# Patient Record
Sex: MALE | URBAN - METROPOLITAN AREA
[De-identification: names, ages, dates, MRNs, and addresses within clinical notes are randomized per-mention and may not be internally consistent; named-entity substitution may affect disease eponyms.]

---

## 2023-05-18 ENCOUNTER — TRANSFERRED RECORDS (OUTPATIENT)
Dept: HEALTH INFORMATION MANAGEMENT | Facility: CLINIC | Age: 38
End: 2023-05-18

## 2023-06-14 ENCOUNTER — MEDICAL CORRESPONDENCE (OUTPATIENT)
Dept: HEALTH INFORMATION MANAGEMENT | Facility: CLINIC | Age: 38
End: 2023-06-14

## 2023-06-14 ENCOUNTER — TRANSCRIBE ORDERS (OUTPATIENT)
Dept: OTHER | Age: 38
End: 2023-06-14

## 2023-06-14 DIAGNOSIS — R63.4 WEIGHT LOSS: Primary | ICD-10-CM

## 2023-06-14 DIAGNOSIS — K63.8219 SMALL INTESTINAL BACTERIAL OVERGROWTH (SIBO): ICD-10-CM

## 2023-06-16 ENCOUNTER — DOCUMENTATION ONLY (OUTPATIENT)
Dept: GASTROENTEROLOGY | Facility: CLINIC | Age: 38
End: 2023-06-16

## 2023-06-16 NOTE — PROGRESS NOTES
RADHA - Kathie Kahn RN - request records from [facility].  For some reason the notes do not come into care every where.    Can I get some notes from:   Lydia Hendricks PA-C   [address] [address]   [facility] [address]     To show 2nd opinion    Faxed request to [facility]  Fax: [phone]  On Friday 6/16/2023 at 11:05AM    Notified Víctor that request was faxed.    Elizabeth Ford MA

## 2023-06-19 ENCOUNTER — TELEPHONE (OUTPATIENT)
Dept: GASTROENTEROLOGY | Facility: CLINIC | Age: 38
End: 2023-06-19

## 2023-06-19 NOTE — TELEPHONE ENCOUNTER
[facility] Call Center    Phone Message    May a detailed message be left on voicemail: yes     Reason for Call: Other: Yany from [facility] called and requested a call back from Kathie Kahn. This is in regards to Kathie having difficulty accessing pt file/Care Eeverywhere. Please call Yany back at  [phone] extension [phone] Yany will be in office until 3:30. Please leave your call back number.    Action Taken: Other: [facility] gi    Travel Screening: Not Applicable